# Patient Record
(demographics unavailable — no encounter records)

---

## 2024-12-11 NOTE — ASSESSMENT
[FreeTextEntry1] : --DM, poorly controlled - med change; endo referal; check labs --neck pain;, concern about wax in ears ENT referral Tdap today Fu 3 months

## 2024-12-11 NOTE — CURRENT MEDS
[Takes medication as prescribed] : does not take [Lack of understanding] : lack of understanding [FreeTextEntry1] : again advised pill box/pill counting

## 2024-12-11 NOTE — PHYSICAL EXAM
[No Acute Distress] : no acute distress [Well Nourished] : well nourished [Well Developed] : well developed [Normal Sclera/Conjunctiva] : normal sclera/conjunctiva [PERRL] : pupils equal round and reactive to light [EOMI] : extraocular movements intact [Normal Outer Ear/Nose] : the outer ears and nose were normal in appearance [Normal Oropharynx] : the oropharynx was normal [Supple] : supple [No Respiratory Distress] : no respiratory distress  [No Accessory Muscle Use] : no accessory muscle use [Clear to Auscultation] : lungs were clear to auscultation bilaterally [Normal Rate] : normal rate  [Regular Rhythm] : with a regular rhythm [Normal S1, S2] : normal S1 and S2 [No Edema] : there was no peripheral edema [Soft] : abdomen soft [Non Tender] : non-tender [Non-distended] : non-distended [No HSM] : no HSM [Normal Bowel Sounds] : normal bowel sounds [No CVA Tenderness] : no CVA  tenderness [No Spinal Tenderness] : no spinal tenderness [No Joint Swelling] : no joint swelling [Grossly Normal Strength/Tone] : grossly normal strength/tone [Coordination Grossly Intact] : coordination grossly intact [No Focal Deficits] : no focal deficits [Normal Gait] : normal gait [Normal Affect] : the affect was normal [Normal Insight/Judgement] : insight and judgment were intact

## 2024-12-11 NOTE — HISTORY OF PRESENT ILLNESS
[de-identified] : 79 year-old male with history of hypertension, uncontrolled diabetes mellitus, hyperlipidemia, coronary artery disease. Pt presenting for annual follow up. Pt was hospitalized for stent placement earlier this year a couple months prior. Patient reports checking blood sugars: -240, average is about 220.  Diabetes meds: -glargine 16 units -Jardiance 10 mg or another medication he doesn't recall -metformin 500mg BID  Pt lives at home with son -social drinker -no tobacco -no illicit drug use  Pt seen with son present. Son is very concerned about fathers health; father is more interested in maintaining independence. Extensive discusion about DM meds, need for adherence. Pt has not been taking jardiance with metformin (one or the other) POCT A1C today 9.4 which is improved (?!) from last time; goal is 8 discussed FS BID with pt goal  post prandial < 200.  Pt also mentions bilateral neck pain; he has had this on and off for a number of years; we have treated at different times as ENT-related vs musculoskeletal.

## 2024-12-11 NOTE — REVIEW OF SYSTEMS
[Sore Throat] : sore throat [Negative] : Psychiatric [Earache] : no earache [Hearing Loss] : no hearing loss [Nosebleeds] : no nosebleeds [Postnasal Drip] : no postnasal drip [Nasal Discharge] : no nasal discharge [Hoarseness] : no hoarseness

## 2024-12-11 NOTE — HISTORY OF PRESENT ILLNESS
[de-identified] : 79 year-old male with history of hypertension, uncontrolled diabetes mellitus, hyperlipidemia, coronary artery disease. Pt presenting for annual follow up. Pt was hospitalized for stent placement earlier this year a couple months prior. Patient reports checking blood sugars: -240, average is about 220.  Diabetes meds: -glargine 16 units -Jardiance 10 mg or another medication he doesn't recall -metformin 500mg BID  Pt lives at home with son -social drinker -no tobacco -no illicit drug use  Pt seen with son present. Son is very concerned about fathers health; father is more interested in maintaining independence. Extensive discusion about DM meds, need for adherence. Pt has not been taking jardiance with metformin (one or the other) POCT A1C today 9.4 which is improved (?!) from last time; goal is 8 discussed FS BID with pt goal  post prandial < 200.  Pt also mentions bilateral neck pain; he has had this on and off for a number of years; we have treated at different times as ENT-related vs musculoskeletal.

## 2025-03-17 NOTE — PLAN
[FreeTextEntry1] : Patient sent for x rays, vascular studies and blood tests , PTR 1 week  Spent 30 minutes for patient care and medical decision making.

## 2025-03-17 NOTE — PHYSICAL EXAM
[0] : left 0 [1+] : left 1+ [Ankle Swelling (On Exam)] : not present [Varicose Veins Of Lower Extremities] : not present [] : not present [Purpura] : no purpura  [Petechiae] : no petechiae [Skin Ulcer] : no ulcer [Skin Induration] : no induration [Alert] : alert [Oriented to Person] : oriented to person [Oriented to Place] : oriented to place [Oriented to Time] : oriented to time [Calm] : calm [de-identified] : calm , accompanied by his son [de-identified] : ASHD, HTN, HLD [de-identified] : no open wounds  [de-identified] : IDDM with neuropathy  [FreeTextEntry1] : Bilateral foot pain [de-identified] :  Dorsalis Pedis: +2 Right/Left Posterior Tibialis: +2 Right/Left Extremity color: normal Extremity temperature: Cool toes to touch Capillary refill: < 3 sec

## 2025-03-17 NOTE — HISTORY OF PRESENT ILLNESS
[FreeTextEntry1] : Patient chief complaint of abnormal sensations and tingling in both lower extremities

## 2025-03-17 NOTE — ASSESSMENT
[Verbal] : Verbal [Patient] : Patient [Family member] : Family member [Good - alert, interested, motivated] : Good - alert, interested, motivated [Verbalizes knowledge/Understanding] : Verbalizes knowledge/understanding [Skin Care] : skin care [Signs and symptoms of infection] : sign and symptoms of infection [Venous Disease] : venous disease [Arterial Disease] : arterial disease [How and When to Call] : how and when to call [Labs and Tests] : labs and tests [Pain Management] : pain management [Patient responsibility to plan of care] : patient responsibility to plan of care [Glycemic Control] : glycemic control [Stable] : stable [Other: ____] : [unfilled] [Ambulatory] : Ambulatory [Not Applicable - Long Term Care/Home Health Agency] : Long Term Care/Home Health Agency: Not Applicable [FreeTextEntry2] : No photo taken, no open wounds Dry skin/skin care Foot care Vascular referral [FreeTextEntry3] : Initial Visit [FreeTextEntry4] : F/U after the vascular studies Sent for XRAY, Lab work Information provided for podiatry clinic, advised patient to perform daily foot inspections Authorization submitted for vascular studies

## 2025-03-17 NOTE — REVIEW OF SYSTEMS
[Fever] : no fever [Chills] : no chills [Eye Pain] : no eye pain [Loss Of Hearing] : no hearing loss [Shortness Of Breath] : no shortness of breath [Wheezing] : no wheezing [Abdominal Pain] : no abdominal pain [Joint Stiffness] : joint stiffness [Skin Lesions] : no skin lesions [Skin Wound] : no skin wound [Anxiety] : no anxiety [Easy Bleeding] : no tendency for easy bleeding [Easy Bruising] : no tendency for easy bruising [Negative] : Genitourinary [FreeTextEntry4] : accompanied by his son  [FreeTextEntry5] : ASHD, HTN, HLD [de-identified] : IDDM with neuropathy  [de-identified] : FLAVIO

## 2025-03-17 NOTE — VITALS
[Pain related to present condition?] : The patient's  pain is related to present condition. [Shooting] : shooting [Occasional] : occasional [] : Yes [de-identified] : 5/10 [FreeTextEntry3] : bilateral feet [FreeTextEntry1] : sporadic nerve pain [FreeTextEntry2] : sporadic nerve pain [FreeTextEntry5] : Medication reconciliation, initial visit

## 2025-04-10 NOTE — HISTORY OF PRESENT ILLNESS
[FreeTextEntry1] : 81yo male accompanied by his son presents as a new patient for pain/numbness of his BLLE.  Pt states these symptoms have progressively worsened over the past few weeks.  Pt had a rescent A1c of 12.8 which was 3 points higher compared to his 9.5 reading from Dec 2024.  Pts son states his father is not compliant with his medication and diet.  His diet is poor and involves many sweets daily.  Pt denies claudication, ischemic rest pain, sob, fever, chills.  He has no current lower extremity wounds.   Pmx:  DM2 with peripheral neuropathy, CAD, PAD, HTN,HLD

## 2025-04-10 NOTE — ASSESSMENT
[FreeTextEntry1] : 79 yo male, with a current poor diet and non-compliant with DM medication regiment presents with BLLE feet peripheral neuropathy secondary to uncontrolled Diabetes.  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  Pt explained to at this time his symptoms of pain/numbness of his feet is from is uncontrolled DM, not his arterial perfusion.   Pt and his son explained to in detail that his worsening A1C level puts him at increase risk of further pedal nerve damage, worsening microvascular disease, and increased risk of infection.  Pt explained to that a wound development in his feet more likely to lead to infection which could ultimately lead to possible limb loss.    Plan: Pt needs to control his BS and lower his A1C. Needs to follow up with Endocrinologist.  -Pt needs to f/u with his PCP for further medical management.  -F/u with podiatry for nail trimming.  -Instructed to be mindful of foot care and to avoid getting cuts or scrapes on feet. Advised to call immediately if he developed rest pain or wounds on his feet.  Prior to appointment and during encounter with patient extensive medical records were reviewed including but not limited to, Hospital records, out patient records, laboratory data and microbiology data In addition extensive time was also spent in reviewing diagnostic studies.  Total encounter total time 45 mins >50% of time spent counseling/coordinating care

## 2025-04-10 NOTE — PHYSICAL EXAM
[Ankle Swelling (On Exam)] : present [Ankle Swelling Bilaterally] : bilaterally  [Ankle Swelling On The Left] : moderate [Alert] : alert [Oriented to Person] : oriented to person [Oriented to Place] : oriented to place [Oriented to Time] : oriented to time [Calm] : calm [Varicose Veins Of Lower Extremities] : not present [] : not present [Skin Ulcer] : no ulcer [de-identified] : Appears well [FreeTextEntry1] : Unable to palpate BLLE pedal pulses LLE- +Triphasic PT/DP signals via doppler RLE- +Triphasic PT  signal, Biphasic DP signal via doppler.  [de-identified] : BLLE mild swelling. Bilateral Hypoesthesia of feet. No current arterial/venous wounds noted.

## 2025-04-10 NOTE — PHYSICAL EXAM
[Ankle Swelling (On Exam)] : present [Ankle Swelling Bilaterally] : bilaterally  [Ankle Swelling On The Left] : moderate [Alert] : alert [Oriented to Person] : oriented to person [Oriented to Place] : oriented to place [Oriented to Time] : oriented to time [Calm] : calm [Varicose Veins Of Lower Extremities] : not present [] : not present [Skin Ulcer] : no ulcer [de-identified] : Appears well [FreeTextEntry1] : Unable to palpate BLLE pedal pulses LLE- +Triphasic PT/DP signals via doppler RLE- +Triphasic PT  signal, Biphasic DP signal via doppler.  [de-identified] : BLLE mild swelling. Bilateral Hypoesthesia of feet. No current arterial/venous wounds noted.

## 2025-04-15 NOTE — ASSESSMENT
[FreeTextEntry1] : Patient complaining of throat discomfort also ears clogged and diminished hearing massive cerumen impaction bilaterally has a perforation in his left ear fiberoptically has some erythema consistent with reflux I put him on Flonase as well as Prilosec over-the-counter follow-up with us as needed.

## 2025-04-15 NOTE — REVIEW OF SYSTEMS
[Hearing Loss] : hearing loss [As Noted in HPI] : as noted in HPI [Hoarseness] : hoarseness [Throat Pain] : throat pain [Negative] : Heme/Lymph

## 2025-04-15 NOTE — END OF VISIT
[FreeTextEntry3] : I, Dr. Renteria personally performed the evaluation and management (E/M) services including all necessary procedures, for this new patient. That E/M includes conducting the clinically appropriate initial history &/or exam, assessing all conditions, and establishing the plan of care. Today, my RADHA, Sienna Means, was here to observe &/or participate in the visit & follow plan of care established by me.

## 2025-04-15 NOTE — CONSULT LETTER
[Dear  ___] : Dear  [unfilled], [Consult Letter:] : I had the pleasure of evaluating your patient, [unfilled]. [Please see my note below.] : Please see my note below. [Consult Closing:] : Thank you very much for allowing me to participate in the care of this patient.  If you have any questions, please do not hesitate to contact me. [Sincerely,] : Sincerely, [FreeTextEntry3] : Duncan Renteria MD [facility] Associated Professor, Leah

## 2025-04-15 NOTE — HISTORY OF PRESENT ILLNESS
[de-identified] : Patient comes in with changes in hearing that have been progressive and bilateral. He does not have any noises  in the ears. 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  He has a very raspy voice that comes and goes and he has a lot of pain intermittently. When he speaks he feels his voice gets more raspy.

## 2025-04-15 NOTE — PHYSICAL EXAM
[Nasal Endoscopy Performed] : nasal endoscopy was performed, see procedure section for findings [] : septum deviated to the left [Midline] : trachea located in midline position [Normal] : no rashes [de-identified] : cerumen in the ear canals; once removed normal EACs [de-identified] : central perforation of the left TM

## 2025-04-22 NOTE — DATA REVIEWED
[de-identified] : NPT done 12/2023: difficulty with processing efficiency and retrieval, isolated language issues - possibly cultural. Borderline WNL-MCI. Labs not done. MRI brain 2023 with minimal MVD, no ICH, no significant atrophy.  All labs are ok, TCD and USCD benign.

## 2025-04-22 NOTE — HISTORY OF PRESENT ILLNESS
[FreeTextEntry1] : NO COVID. COVID VACCINE FULL.  [id number]: -URI in , resolved -appetite reduced, states he has a "normal diet" - yet A1C is reported to still be in the 12% or so -sleep on and off - interrupted -motor - affected by fatigue -ADL and IADL ok, drives and no issues are present. -lives with son -last MRI . --------------------------------------------------------- HPI-Interval hx 2024: since last seen: -no significant changes - but pt and son feel he is more irritable and pt feels his memory is a bit worse -all labs are ok, TCD and USCD benign - glucose still high -he feels tired -diet is erratic.  Pt seen in the past by TRINA Falcon: HPI: 78 YO RH man presents by himself for memory problems. This first started about two years ago when he noticed that he was having difficulty remembering things. He will forget names after years of knowing people, misplace things. He will read something and will not remember what he read.  His son has mentioned that he doesn't remember anything. His wife passed away three years ago.  PMH: CAD w/  stents, HTN, HLD, DM, hypothyroidism  PFH: no history of dementia.  NPT done 2023: difficulty with processing efficiency and retrieval, isolated language issues - possibly cultural. Borderline WNL-MCI. Labs not done. MRI brain  with minimal MVD, no ICH, no significant atrophy. Per pt, still forgetful, most recent things and conversations; most things he may get back; per son, he may not have awareness of surrounding and situations. Maintains weight but has lost muscle. Not much moving-sedentary. CAD c.a. 8 months ago, s/p stents, now on ASA81 and PLAVIX 75. Since then, reduced motor and cognitive status.  -Memory:  short term memory impaired; remote memory intact  -Speech: difficulty with articulation, changes in his voice as per son. No difficulty with findings words or using the wrong words. No difficulty with comprehension. Fluent  -Orientation: no issues  -Praxis: no issues  -Decision making/Executive fx/Multitasking: no issues   -Sleep: 4-5 hours at night. 1-2 hours during the day. No REM sleep behavior. No witnessed snoring  -Appetite: no issues  -Motor symptoms: No slow movements. No tremors or shuffling of the gait  -B/B:  no issues  -Psychiatric symptoms: feeling lonely   -Functional status: Yanez Index of [place] in Activities of Daily Livin. Bathing/Showerin 2. Dressin 3. Toiletin 4. Transferrin 5. Continence: 1 6: Feedin  TOTAL:   Cheryl-Aramis Instrumental Activities of Daily Living: A. Ability to Use Telephone: 1 B. Shoppin C. Food Preparation: 1 D. Housekeepin E. Laundry: 1 F. Transportation: 1 G. Responsibility for Own Medications: 1 H: Ability to Handle Finances: 1  TOTAL: 8  CDR: na  -Professional status:  Retired. He worked for [employer]. He is  with one child. He livees with his son.  No drug use. Minimal alcohol use.  He is not active during the day. He has been at home not doing much.   PCP and other physicians: -PCP: RODRIGO RUSSELL   Workup done: none

## 2025-04-22 NOTE — PHYSICAL EXAM
[General Appearance - Alert] : alert [General Appearance - In No Acute Distress] : in no acute distress [Oriented To Time, Place, And Person] : oriented to person, place, and time [Affect] : the affect was normal [Mood] : the mood was normal [Person] : oriented to person [Place] : oriented to place [Time] : oriented to time [Short Term Intact] : short term memory intact [Remote Intact] : remote memory intact [Registration Intact] : recent registration memory intact [Concentration Intact] : normal concentrating ability [Visual Intact] : visual attention was ~T not ~L decreased [Naming Objects] : no difficulty naming common objects [Repeating Phrases] : no difficulty repeating a phrase [Writing A Sentence] : no difficulty writing a sentence [Fluency] : fluency intact [Comprehension] : comprehension intact [Reading] : reading intact [Current Events] : adequate knowledge of current events [Past History] : adequate knowledge of personal past history [Vocabulary] : adequate range of vocabulary [Total Score ___ / 30] : the patient achieved a score of [unfilled] /30 [Date / Time ___ / 5] : date / time [unfilled] / 5 [Place ___ / 5] : place [unfilled] / 5 [Registration ___ / 3] : registration [unfilled] / 3 [Serial Sevens ___/5] : serial sevens [unfilled] / 5 [Naming 2 Objects ___ / 2] : naming two objects [unfilled] / 2 [Repeating a Sentence ___ / 1] : repeating a sentence [unfilled] / 1 [Writing a Sentence ___ / 1] : write sentence [unfilled] / 1 [3-stage Verbal Command ___ / 3] : three-stage verbal command [unfilled] / 3 [Written Command ___ / 1] : written command [unfilled] / 1 [Copy a Design ___ / 1] : copy a design [unfilled] / 1 [Recall ___ / 3] : recall [unfilled] / 3 [Cranial Nerves Optic (II)] : visual acuity intact bilaterally,  visual fields full to confrontation, pupils equal round and reactive to light [Cranial Nerves Oculomotor (III)] : extraocular motion intact [Cranial Nerves Trigeminal (V)] : facial sensation intact symmetrically [Cranial Nerves Facial (VII)] : face symmetrical [Cranial Nerves Vestibulocochlear (VIII)] : hearing was intact bilaterally [Cranial Nerves Accessory (XI - Cranial And Spinal)] : head turning and shoulder shrug symmetric [Cranial Nerves Glossopharyngeal (IX)] : tongue and palate midline [Cranial Nerves Hypoglossal (XII)] : there was no tongue deviation with protrusion [Motor Tone] : muscle tone was normal in all four extremities [Motor Strength] : muscle strength was normal in all four extremities [Involuntary Movements] : no involuntary movements were seen [No Muscle Atrophy] : normal bulk in all four extremities [Motor Handedness Right-Handed] : the patient is right hand dominant [Sensation Tactile Decrease] : light touch was intact [1+] : Triceps left 1+ [0] : Ankle jerk left 0 [Sclera] : the sclera and conjunctiva were normal [PERRL With Normal Accommodation] : pupils were equal in size, round, reactive to light, with normal accommodation [Extraocular Movements] : extraocular movements were intact [No APD] : no afferent pupillary defect [No HCRIS] : no internuclear ophthalmoplegia [Full Visual Field] : full visual field [Outer Ear] : the ears and nose were normal in appearance [Neck Appearance] : the appearance of the neck was normal [] : no respiratory distress [Heart Rate And Rhythm] : heart rate was normal and rhythm regular [Abdomen Soft] : soft [No CVA Tenderness] : no ~M costovertebral angle tenderness [Musculoskeletal - Swelling] : no joint swelling seen [Skin Color & Pigmentation] : normal skin color and pigmentation [FreeTextEntry1] : [id number]: exam substantially stable. [Span Intact] : the attention span was decreased [Paresis Pronator Drift Right-Sided] : no pronator drift on the right [Paresis Pronator Drift Left-Sided] : no pronator drift on the left [Motor Strength Upper Extremities Bilaterally] : strength was normal in both upper extremities [Motor Strength Lower Extremities Bilaterally] : strength was normal in both lower extremities [Romberg's Sign] : Romberg's sign was negtive [Past-pointing] : there was no past-pointing [Tremor] : no tremor present [Dysdiadochokinesia Bilaterally] : not present [Plantar Reflex Right Only] : normal on the right [Plantar Reflex Left Only] : normal on the left [Glabellar Reflex] : the glabellar reflex was absent [FreeTextEntry4] : Mental Status Exam: A&Oxx3 Presidents: 5/5 Alternating Pattern: no issue Spiral: no issue Clock: no issue Repetition: no issue  Trail A:  no issue < 90 seconds B: no issue < 3 min  Fluency: A: no issue  Animals: no issue Go-No-Go: no issue R/L discrimination on self and examiner: no issue Cross-line commands:  no issue Praxis: no issue -Motor: no issue -Dynamic/Luria: no issue -Ideomotor/Imitation:  no issue -Ideational/writing/closing-in: -Dressing: Proverbs: -It's no use crying over spilled milk: no issue -A rolling stone gathers no mota -A chain is only as strong as its weakest link: issues -You can't teach an old dog new tricks: issues -Laughter is the best medicine. mild issues   FW and RW digit span: -4-9-3: no issue -3-8-1-4 no issue -6-2-9-7-1 no issue -7-1-8-4-6-2 no issue  -1-4-2 -1-8-3-1 -4-9-1-5-3 -3-7-6-5-1-9   [FreeTextEntry8] : Upright posture. Normal stride and arm swing. Pull test unremarakble. He is able to march in place with no issues

## 2025-04-22 NOTE — ASSESSMENT
[FreeTextEntry1] : Assessment:  79yo RH man w/ PMH of CAD aspirin and plavix, HTN, HLD, DM, hypothyroidism presents with progressive short term memory problems over the past three years. Uncontrolled DM, with high A1c, can be affecting his STM. MRI brain 2023 with minor atrophy and mild MVD per age. Independent w/ ADLs & IADLS; is not active during the day Exam once again benign. NPT with mild exfx issues. In all, low % of neurodegenerative disease at this time. Likely physiological ageing compounded by metabolic effect of DM, CAD/perfusion. USCD/TCD - all benign. Low energy but resistant to exercise and diet.  At this point, will refer to Endo for an overhaul of his diet and DM management - not working. Will repeat MRI to follow on atrophy and MVD.  Diagnostic Impression:  -MCI   Plan: -repeat MRI -endo referral (Dr. Guidry) -Belsomra - will start again. I recommended to pursue mental and physical activity and to adhere to Mediterranean type of diet.

## 2025-04-28 NOTE — HISTORY OF PRESENT ILLNESS
[FreeTextEntry1] : Patient is a 80M with HTN, HLD, T2DM, CAD s/p SONIA, here with new visit for T2DM.   Patient presents with his son. Reports generalized fatigue but denies acute complaints. Appetite is adequate, and denies nausea, polyuria or polydipsia. Concerned with elevated A1c level recently  Regarding diabetes history, Diagnosed: dx'd in 2020   A1c: 9.4% on 12/2024 > 12.1% 3/2025 (done by wound care team)   Current regimen: metformin 1000mg BID (for few days, increased from 500mg BID), jardiance 10mg daily, lantus 16 units seldomly "only if goes above 350." (though last pcp rec'd 24 units bedtime).   Glycemic monitoring: Checks fingerstick, x2-3 daily. Fasting 200-300 consistently throughout the day   Hypoglycemia symptoms/events: denies recently   Diet:  food- wheat/roti, cooked veggies, vegetable oil, curries. Eggs. Only sometimes chicken Sugary beverages or snack- "sometimes" cookies. Denies sugary beverages. Mainly drinks water   Physical activity: Sedentary   Diabetes complications and comorbidities: Retinopathy- Last eye visit 2/2025, with mild NPDR and glaucoma Neuropathy- Pt was referred to podiatry recently- plan to make appt this year and has contact information Nephropathy- CMP with eGFR 82,  on 12/2024. No MACR on record  Macrovascular complications: CAD s/p SONIA  Dyslipidemia: on atorvastatin 40mg daily. LDL 75 on 12/2024. Follows cardiologist  Family history of DM: no family with DM   Social: Tobacco use- Denies  ETOH- rarely

## 2025-04-28 NOTE — PHYSICAL EXAM
[TextEntry] : GENERAL: awake, NAD   HEENT: NCAT   CARDIAC: RRR, S1, S2 present   LUNGS: CTA b/l, comfortable respirations on room air   ABD: Soft, NT, ND   EXT: warm, well-perfused, no edema   SKIN: No lesions noted. Dry and warm

## 2025-04-28 NOTE — ASSESSMENT
[FreeTextEntry1] : Patient is a 80M with HTN, HLD, T2DM, CAD s/p SONIA, here with new visit for T2DM.  # T2DM Target HbA1c <7.5-8% given age and comorbidities A1c: 9.4% on 12/2024 > 12.1% 3/2025, above goal  BG monitoring: Will start free style sathish 3 plus sensor. The use of Continuous Glucose Monitoring (CGM) will help the patient in managing their diabetes to improve glycemic control. The patient or caregiver has received appropriate training for CGM use and is able to hear/respond sensor alerts. Pt is on insulin regimen.  Medication regimen: Continue metformin 1000mg BID and jardiance 10mg daily. Advised to be on lantus 16 units every bedtime. Start trulicity 0.75mg weekly.  Pt denies hx of pancreatitis or fhx of MTC. Advised adverse effects including nausea, abdominal pain and bloating, for which if severe pt will contact office.   Lifestyle modifications: Recommend increasing weekly physical activity as tolerated and dietary modifications- limiting sugary beverages and fried foods, incorporating more fruits/vegetables and whole grains in diet  Monitoring for complications: Retinopathy- Last eye visit 2/2025, with mild NPDR and glaucoma Neuropathy- Pt was referred to podiatry recently- plan to make appt this year and has contact information Nephropathy- CMP with eGFR 82,  on 12/2024. Check MACR once glycemic control improves next visit- on ARB and SGLT-2  # HLD - C/w atorvastatin 40mg daily.  - LDL 75 on 12/2024, at goal. Follows cardiology  # HTN - Normotensive today - C/w losartan 100mg daily  # Hypothyroidism - C/w levothyroxine 25mcg qAM - TSH 7.11 on 12/2024, up-trending - Will check TSH again today  RTC 6 weeks with NNT visit, or 3m with provider. Pt prefers [place] location, for which will arrange follow up visit accordingly.

## 2025-04-30 NOTE — PHYSICAL EXAM
[No Acute Distress] : no acute distress [Well Nourished] : well nourished [Well Developed] : well developed [de-identified] : the patient was a bit more withdrawn and seemed for much of the visit to be having more difficulty with memory.

## 2025-04-30 NOTE — HISTORY OF PRESENT ILLNESS
[FreeTextEntry1] : fu/coord care and pt education/guidance for multiple chronic problems [de-identified] : Pt seen with son. Chart reviewed, including endo (DM/Thyroid), Neuro([facility]/cognitive), SURINDER (cerumen, throat pain), Wound Care/Surgery (foot wound), cardiology (CAD). The patient appeared more fatigued and his memory and fluency were decreased. In recent Endo visit found to have incxreased A1C to 12.4; he heard from endo and neuro that most important thing for his health was to get glucose under control, with which I concur 100%. He has gotten his new prescription for lantus, although has some hesitancy about taking the shot. He has started on the GLP1 (1 dose past 1-2 days). He also has gotten but is not aware how to use the continous glucose monitor. We spent about 35 minutes in discussion of DM, DM management; the need for lifelong treatment likely with insulin, etc. Many of these topics had been discussed before. His son was increasingly involved and aware. They are going to the pharmacy after todays visit to get further instruction regarding the CGM. The patient agreed to start the lantus 16 tonight and they were asked to followup by phone in 1 week to report on CGM results and tolerance of medication.  We also discussed his recent MRI (results pending) and visit with neuro; his cerumen removal and negative eval of throat; and the healing of his wounds which we also discussed in light of DM. The patient asked about fatigue; again the most likely cause is also poor sugar control.  I will have our nurse fu in 1 week to see how things are going. Despite the patients repeated denial I think depression is part of this condition with a neglect of self care. I have raised this with the patient and he has shown no interest in pursuing this direction of care. I will bring this up with his son separately once again.

## 2025-07-30 NOTE — HISTORY OF PRESENT ILLNESS
[FreeTextEntry1] : FU, DM [de-identified] : seen with son. pt stated did not know why he was here son brought him in for fu. Has had no endo or med fu since last visit when A1C was 12. Has planned eye surgery for sept. Advised about need for preop, also need for better glycemic control for surgery (as well as for other things). Pt has CGM but not using/having trouble using. Has trulicity but has not used. Not using long-acting insulin His A1C today is 10.9 (improved)? STates is going to gym./exercising regularly. Checks sugars, says when sugars are lower he feels less fatigued/less urination Focus again was trying to encourage proper and regular use of meds and to try to elicit what the patients barriers to treatment are. His son is frustrated as he reminds his dad but the dad kind of ignores the direction.  The surgery is of importance to him so I am hoping this will lead to better adherence.  Advised FU with endo q3 months, fu here q3 months. No other issues addressed today. THe patients mood and cognitive status seem about the same.

## 2025-07-30 NOTE — PHYSICAL EXAM
[Normal] : no acute distress, well nourished, well developed and well-appearing [Speech Grossly Normal] : speech grossly normal [Normal Mood] : the mood was normal [de-identified] : Limited insight